# Patient Record
Sex: MALE | Race: BLACK OR AFRICAN AMERICAN | NOT HISPANIC OR LATINO | Employment: FULL TIME | URBAN - METROPOLITAN AREA
[De-identification: names, ages, dates, MRNs, and addresses within clinical notes are randomized per-mention and may not be internally consistent; named-entity substitution may affect disease eponyms.]

---

## 2024-07-26 ENCOUNTER — TELEPHONE (OUTPATIENT)
Age: 24
End: 2024-07-26

## 2024-07-26 ENCOUNTER — OFFICE VISIT (OUTPATIENT)
Dept: INTERNAL MEDICINE CLINIC | Facility: CLINIC | Age: 24
End: 2024-07-26

## 2024-07-26 VITALS
WEIGHT: 192 LBS | OXYGEN SATURATION: 97 % | HEIGHT: 70 IN | BODY MASS INDEX: 27.49 KG/M2 | TEMPERATURE: 98.5 F | DIASTOLIC BLOOD PRESSURE: 70 MMHG | SYSTOLIC BLOOD PRESSURE: 118 MMHG | RESPIRATION RATE: 84 BRPM

## 2024-07-26 DIAGNOSIS — Z02.1 PRE-EMPLOYMENT EXAMINATION: Primary | ICD-10-CM

## 2024-07-26 PROCEDURE — 99499 UNLISTED E&M SERVICE: CPT | Performed by: INTERNAL MEDICINE

## 2024-07-26 RX ORDER — METHYLPHENIDATE HYDROCHLORIDE 20 MG/1
20 CAPSULE, EXTENDED RELEASE ORAL DAILY
COMMUNITY
Start: 2024-05-05

## 2024-07-26 RX ORDER — METHYLPHENIDATE HYDROCHLORIDE 10 MG/1
TABLET ORAL
COMMUNITY
Start: 2024-07-14

## 2024-07-26 RX ORDER — BLOOD-GLUCOSE SENSOR
EACH MISCELLANEOUS
COMMUNITY
Start: 2024-07-16

## 2024-07-26 RX ORDER — METHYLPHENIDATE HYDROCHLORIDE 30 MG/1
CAPSULE, EXTENDED RELEASE ORAL
COMMUNITY

## 2024-07-26 RX ORDER — BUPROPION HYDROCHLORIDE 100 MG/1
TABLET, EXTENDED RELEASE ORAL
COMMUNITY
Start: 2024-07-13

## 2024-07-26 NOTE — PROGRESS NOTES
Assessment/Plan:    Diagnoses and all orders for this visit:    Pre-employment examination    Other orders  -     buPROPion (WELLBUTRIN SR) 100 mg 12 hr tablet  -     Continuous Glucose Sensor (FreeStyle Reilly 3 Sensor) MISC  -     methylphenidate (RITALIN) 10 mg tablet;  (Patient not taking: Reported on 7/26/2024)  -     methylphenidate (RITALIN LA) 20 MG 24 hr capsule; Take 20 mg by mouth daily (Patient not taking: Reported on 7/26/2024)  -     methylphenidate (RITALIN LA) 30 MG 24 hr capsule; 1 TABLET DAILY       Patient comes for preemployment physical exam  He has a history of diabetes, currently off treatment.  Reports that he self monitors with his reilly and blood sugars are between 100-200.  Recommend to establish care with PCP for further management.  Denies any subjective symptoms today       There are no Patient Instructions on file for this visit.    Subjective:      Patient ID: Prince Bateman is a 24 y.o. male    HPI      Current Outpatient Medications:     buPROPion (WELLBUTRIN SR) 100 mg 12 hr tablet, , Disp: , Rfl:     Continuous Glucose Sensor (FreeStyle Reilly 3 Sensor) MISC, , Disp: , Rfl:     methylphenidate (RITALIN LA) 30 MG 24 hr capsule, 1 TABLET DAILY, Disp: , Rfl:     methylphenidate (RITALIN LA) 20 MG 24 hr capsule, Take 20 mg by mouth daily (Patient not taking: Reported on 7/26/2024), Disp: , Rfl:     methylphenidate (RITALIN) 10 mg tablet, , Disp: , Rfl:      History reviewed. No pertinent past medical history.      History reviewed. No pertinent surgical history.      No Known Allergies    No results found for this or any previous visit (from the past 1008 hour(s)).    The following portions of the patient's history were reviewed and updated as appropriate: allergies, current medications, past family history, past medical history, past social history, past surgical history and problem list.     Review of Systems   Constitutional:  Negative for activity change, appetite change, chills,  diaphoresis, fatigue, fever and unexpected weight change.   HENT:  Negative for congestion and sore throat.    Respiratory:  Negative for apnea, cough, choking, chest tightness, shortness of breath, wheezing and stridor.    Cardiovascular:  Negative for chest pain, palpitations and leg swelling.   Gastrointestinal:  Negative for abdominal distention, abdominal pain, blood in stool, constipation, nausea and rectal pain.   Genitourinary:  Negative for dysuria, flank pain, frequency and urgency.   Musculoskeletal:  Negative for arthralgias, back pain, gait problem, joint swelling and myalgias.   Skin:  Negative for color change, pallor and rash.   Neurological:  Negative for headaches.         Objective:      Vitals:    07/26/24 1455   BP: 118/70   Resp: (!) 84   Temp: 98.5 °F (36.9 °C)   SpO2: 97%          Physical Exam  Vitals reviewed.   Constitutional:       General: He is not in acute distress.     Appearance: Normal appearance. He is not ill-appearing, toxic-appearing or diaphoretic.   HENT:      Mouth/Throat:      Mouth: Mucous membranes are moist.   Cardiovascular:      Rate and Rhythm: Normal rate and regular rhythm.      Pulses: Normal pulses.      Heart sounds: Normal heart sounds. No murmur heard.     No friction rub. No gallop.   Pulmonary:      Effort: Pulmonary effort is normal. No respiratory distress.      Breath sounds: Normal breath sounds. No stridor. No wheezing, rhonchi or rales.   Chest:      Chest wall: No tenderness.   Abdominal:      General: There is no distension.      Palpations: Abdomen is soft.      Tenderness: There is no abdominal tenderness. There is no guarding.   Musculoskeletal:      Right lower leg: No edema.      Left lower leg: No edema.   Skin:     General: Skin is warm and dry.      Findings: No lesion or rash.   Neurological:      Mental Status: He is alert and oriented to person, place, and time.

## 2024-07-26 NOTE — TELEPHONE ENCOUNTER
A form that Angie @ [employer] got was not filled out the part w/the job limitations page was not signed by the physician.  Can the doctor sign that page and send back to them.Fax# [phone]      Appointment Information   Name: Prince Fransico MRN: [id number]   Date: 7/26/2024 Status: Comp   Time: 2:45 PM Length: 30   Visit Type: PHYSICAL PG [[id number]] Copay: $0.00   Provider: Paul Washburn MD Department: PG [facility] INTERNAL MED [facility] Area: [facility] INTERNAL MEDICINE Department Phone: [phone]   Referral Number:   Referral Status:     Enc Form Number: [id number]       Notes: [employer] No TB test